# Patient Record
(demographics unavailable — no encounter records)

---

## 2024-10-15 NOTE — REVIEW OF SYSTEMS
[Frequency] : frequency [Fever] : no fever [Chills] : no chills [Chest Pain] : no chest pain [Palpitations] : no palpitations [Lower Ext Edema] : no lower extremity edema [Shortness Of Breath] : no shortness of breath [Wheezing] : no wheezing [Cough] : no cough [Abdominal Pain] : no abdominal pain

## 2024-10-15 NOTE — PLAN
[FreeTextEntry1] : advised diet change or hw ill need additional meds  PT will try diet.  complex decision making    All labs reviewed in detail

## 2025-05-27 NOTE — HISTORY OF PRESENT ILLNESS
[de-identified] : Pt comes for med renewal and discussion of labs  Pt has not been following diet. HGBa1c is 9.0 Pt aware that his DM2 is uncontrolled despite meds